# Patient Record
Sex: MALE | NOT HISPANIC OR LATINO
[De-identification: names, ages, dates, MRNs, and addresses within clinical notes are randomized per-mention and may not be internally consistent; named-entity substitution may affect disease eponyms.]

---

## 2024-09-11 ENCOUNTER — DASHBOARD ENCOUNTERS (OUTPATIENT)
Age: 39
End: 2024-09-11

## 2024-09-11 ENCOUNTER — OFFICE VISIT (OUTPATIENT)
Dept: URBAN - METROPOLITAN AREA CLINIC 74 | Facility: CLINIC | Age: 39
End: 2024-09-11
Payer: COMMERCIAL

## 2024-09-11 ENCOUNTER — LAB OUTSIDE AN ENCOUNTER (OUTPATIENT)
Dept: URBAN - METROPOLITAN AREA CLINIC 74 | Facility: CLINIC | Age: 39
End: 2024-09-11

## 2024-09-11 VITALS
WEIGHT: 150 LBS | DIASTOLIC BLOOD PRESSURE: 74 MMHG | BODY MASS INDEX: 21 KG/M2 | HEIGHT: 71 IN | TEMPERATURE: 98.1 F | SYSTOLIC BLOOD PRESSURE: 134 MMHG | HEART RATE: 98 BPM

## 2024-09-11 DIAGNOSIS — K64.4 EXTERNAL HEMORRHOIDS: ICD-10-CM

## 2024-09-11 DIAGNOSIS — G82.20 LOWER PARAPLEGIA: ICD-10-CM

## 2024-09-11 DIAGNOSIS — K62.5 RECTAL BLEEDING: ICD-10-CM

## 2024-09-11 PROCEDURE — 99203 OFFICE O/P NEW LOW 30 MIN: CPT

## 2024-09-11 RX ORDER — OXYBUTYNIN CHLORIDE 5 MG/1
1 TABLET TABLET ORAL ONCE A DAY
Status: ACTIVE | COMMUNITY

## 2024-09-11 NOTE — HPI-TODAY'S VISIT:
The patient is a 38 y.o. year old male with hx of T4 spinal cord injury with no sensation distal to diaphragm who presents to the office for evaluation of rectal bleeding. Patient does perform digital stimulation or rectal irrigation for bowel regimen.  Patient was seen in February 2024 by Colorectal surgery for anorectal pain detected by dysreflexia symptoms. MRI pelvis was ordered to rule out anorectal sinus tract. Has hx of perianal abscess requiring surgery in 2014. 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 He has not had a colonoscopy.  No known family history of colorectal cancer. --Today, patient reports intermittent dark stools for multiple months, Bright red blood mixed in with stool this week. Every other day irrigation enemas. No iron and pepto bismol, no NSAID use. Patient denies any acid reflux or heart burn.   MRI pelvis 5/30/24: FINDINGS: There is mild wall thickening of urinary bladder. Limited assessment of seminal vesicles and prostate gland unremarkable. There is no significant free fluid in pelvis. There is no significant fluid or inflammatory stranding in the ischiorectal or the ischioanal fossa. There is no evidence of perianal fistula. There is no discrete walled off rim-enhancing fluid collection.    CT abdomen/pelvis 10/14/2023: Liver: Subcentimeter hypodense lesion in the right hepatic lobe is too small to accurately characterize but most likely due to a cyst. Gallbladder: Normal. Pancreas: Normal. Spleen: Normal. Adrenals: Normal. Kidneys: Tiny nonobstructing bilateral renal calculi. No hydronephrosis. No ureteral calculi. No focal renal mass. Gastrointestinal: No bowel obstruction or inflammatory changes.  The appendix is normal in appearance. Pelvis: Prostate and seminal vesicles are normal. The urinary bladder appears unremarkable. Vasculature: Normal caliber of the abdominal aorta. Lymph nodes: No enlarged lymph nodes. Bones: No acute osseous abnormality or suspicious osseous lesions. Partially visualized posterior spinal fusion hardware of the thoracic spine without evidence of hardware loosening. IMPRESSION: No acute findings

## 2024-09-12 LAB
A/G RATIO: 2
ABSOLUTE BASOPHILS: 43
ABSOLUTE EOSINOPHILS: 72
ABSOLUTE LYMPHOCYTES: 1872
ABSOLUTE MONOCYTES: 497
ABSOLUTE NEUTROPHILS: 4716
ALBUMIN: 5.2
ALKALINE PHOSPHATASE: 81
ALT (SGPT): 18
AST (SGOT): 16
BASOPHILS: 0.6
BILIRUBIN, TOTAL: 1
BUN/CREATININE RATIO: (no result)
BUN: 13
CALCIUM: 9.9
CARBON DIOXIDE, TOTAL: 25
CHLORIDE: 99
CREATININE: 0.72
EGFR: 120
EOSINOPHILS: 1
GLOBULIN, TOTAL: 2.6
GLUCOSE: 77
HEMATOCRIT: 50
HEMOGLOBIN: 16.7
LYMPHOCYTES: 26
MCH: 29.4
MCHC: 33.4
MCV: 88
MONOCYTES: 6.9
MPV: 9.5
NEUTROPHILS: 65.5
PLATELET COUNT: 273
POTASSIUM: 4
PROTEIN, TOTAL: 7.8
RDW: 12.9
RED BLOOD CELL COUNT: 5.68
SODIUM: 137
WHITE BLOOD CELL COUNT: 7.2

## 2024-09-15 ENCOUNTER — WEB ENCOUNTER (OUTPATIENT)
Dept: URBAN - METROPOLITAN AREA CLINIC 74 | Facility: CLINIC | Age: 39
End: 2024-09-15

## 2024-09-17 ENCOUNTER — LAB OUTSIDE AN ENCOUNTER (OUTPATIENT)
Dept: URBAN - METROPOLITAN AREA CLINIC 74 | Facility: CLINIC | Age: 39
End: 2024-09-17

## 2024-12-02 ENCOUNTER — WEB ENCOUNTER (OUTPATIENT)
Dept: URBAN - METROPOLITAN AREA CLINIC 40 | Facility: CLINIC | Age: 39
End: 2024-12-02

## 2024-12-12 ENCOUNTER — OFFICE VISIT (OUTPATIENT)
Dept: URBAN - METROPOLITAN AREA MEDICAL CENTER 9 | Facility: MEDICAL CENTER | Age: 39
End: 2024-12-12

## 2025-01-17 ENCOUNTER — OFFICE VISIT (OUTPATIENT)
Dept: URBAN - METROPOLITAN AREA CLINIC 40 | Facility: CLINIC | Age: 40
End: 2025-01-17